# Patient Record
Sex: FEMALE | Race: WHITE | Employment: FULL TIME | URBAN - METROPOLITAN AREA
[De-identification: names, ages, dates, MRNs, and addresses within clinical notes are randomized per-mention and may not be internally consistent; named-entity substitution may affect disease eponyms.]

---

## 2018-01-18 ENCOUNTER — OFFICE VISIT (OUTPATIENT)
Dept: OBGYN CLINIC | Facility: CLINIC | Age: 33
End: 2018-01-18

## 2018-01-18 VITALS
BODY MASS INDEX: 24.28 KG/M2 | SYSTOLIC BLOOD PRESSURE: 110 MMHG | HEIGHT: 64 IN | WEIGHT: 142.19 LBS | DIASTOLIC BLOOD PRESSURE: 70 MMHG

## 2018-01-18 DIAGNOSIS — Z01.419 WOMEN'S ANNUAL ROUTINE GYNECOLOGICAL EXAMINATION: Primary | ICD-10-CM

## 2018-01-18 DIAGNOSIS — Z12.4 ROUTINE CERVICAL SMEAR: ICD-10-CM

## 2018-01-18 DIAGNOSIS — M51.9 DISORDER OF INTERVERTEBRAL DISC OF THORACIC SPINE: ICD-10-CM

## 2018-01-18 PROCEDURE — 87624 HPV HI-RISK TYP POOLED RSLT: CPT | Performed by: OBSTETRICS & GYNECOLOGY

## 2018-01-18 PROCEDURE — 99385 PREV VISIT NEW AGE 18-39: CPT | Performed by: OBSTETRICS & GYNECOLOGY

## 2018-01-18 PROCEDURE — 88175 CYTOPATH C/V AUTO FLUID REDO: CPT | Performed by: OBSTETRICS & GYNECOLOGY

## 2018-01-18 NOTE — PROGRESS NOTES
GYN H&P     2018  3:26 PM    CC: Patient is here to establish care    HPI: Patient is a 34year old  here for annual exam. 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  Cycles regular with some early spotting but no skipped men 110/70   Ht 64\"   Wt 142 lb 3.2 oz   LMP 01/04/2018 (Exact Date)   Breastfeeding?  No   BMI 24.41 kg/m²     Exam:   GENERAL: well developed, well nourished, in no apparent distress  SKIN: no rashes, no lesions  HEENT: normal  LUNGS: respiration unlabored

## 2018-01-19 LAB — HPV I/H RISK 1 DNA SPEC QL NAA+PROBE: NEGATIVE

## 2018-01-26 ENCOUNTER — OFFICE VISIT (OUTPATIENT)
Dept: FAMILY MEDICINE CLINIC | Facility: CLINIC | Age: 33
End: 2018-01-26

## 2018-01-26 VITALS
WEIGHT: 144 LBS | OXYGEN SATURATION: 100 % | RESPIRATION RATE: 17 BRPM | SYSTOLIC BLOOD PRESSURE: 112 MMHG | HEIGHT: 64 IN | DIASTOLIC BLOOD PRESSURE: 80 MMHG | HEART RATE: 69 BPM | TEMPERATURE: 98 F | BODY MASS INDEX: 24.59 KG/M2

## 2018-01-26 DIAGNOSIS — M79.645 FINGER PAIN, LEFT: ICD-10-CM

## 2018-01-26 DIAGNOSIS — R53.82 CHRONIC FATIGUE: Primary | ICD-10-CM

## 2018-01-26 DIAGNOSIS — G47.10 HYPERSOMNOLENCE: ICD-10-CM

## 2018-01-26 DIAGNOSIS — S69.92XA INJURY OF LEFT MIDDLE FINGER, INITIAL ENCOUNTER: ICD-10-CM

## 2018-01-26 PROCEDURE — 99204 OFFICE O/P NEW MOD 45 MIN: CPT | Performed by: FAMILY MEDICINE

## 2018-01-26 NOTE — PROGRESS NOTES
Sharri Epperson is a 34year old female. Patient presents with:  Establish Care: energy level is low, lythargic - since she was 15  Finger Pain: playing basketball 12/26/17 L middle and L ring      HPI:   Here with low level of energy since 12 yo.  Has SOCIAL HISTORY:     Social History  Social History   Marital status: Single  Spouse name: N/A    Years of education: N/A  Number of children: N/A     Occupational History  None on file     Social History Main Topics   Smoking status: Never Smoker    [employer] - XR FINGER(S) (MIN 2 VIEWS), LEFT 3RD (CPT=73140); Future    4.  Hypersomnolence  - OP [facility] ALT REFERRAL DIAGOSTIC SLEEP STUDY ADULT    Referred for sleep study, likely airway compression  Consider labs if normal  X-ray of finger to evaluate for small fract Start taking 1/2 tsp daily and increase dose every 5-7 days until taking 1 tsp 3 times daily. Reduce to lowest dose tolerated if any reactions occur.  Buy it online at  [email or web address] or at the [employer] in [place].        Vitamin D is an im

## 2018-01-26 NOTE — PATIENT INSTRUCTIONS
The products and items listed below (the “Products”)  and their claims have not been evaluated by the Food and Drug Administration. Dietary products are not intended to treat, prevent, mitigate or cure disease.  Ultimately, you must draw your own conclusion Dosage:  Take 5000 units daily ongoing

## 2018-02-05 ENCOUNTER — MED REC SCAN ONLY (OUTPATIENT)
Dept: FAMILY MEDICINE CLINIC | Facility: CLINIC | Age: 33
End: 2018-02-05

## 2018-02-05 ENCOUNTER — TELEPHONE (OUTPATIENT)
Dept: FAMILY MEDICINE CLINIC | Facility: CLINIC | Age: 33
End: 2018-02-05

## 2018-02-05 NOTE — TELEPHONE ENCOUNTER
Called patient back, spoke with patient regarding [place] Sleepiness Scale per Insurance/referrals. Faxed form to Luis Eduardo Riley in referrals.